# Patient Record
Sex: FEMALE | URBAN - METROPOLITAN AREA
[De-identification: names, ages, dates, MRNs, and addresses within clinical notes are randomized per-mention and may not be internally consistent; named-entity substitution may affect disease eponyms.]

---

## 2022-04-20 ENCOUNTER — APPOINTMENT (RX ONLY)
Dept: URBAN - METROPOLITAN AREA CLINIC 40 | Facility: CLINIC | Age: 22
Setting detail: DERMATOLOGY
End: 2022-04-20

## 2022-04-20 DIAGNOSIS — Z41.9 ENCOUNTER FOR PROCEDURE FOR PURPOSES OTHER THAN REMEDYING HEALTH STATE, UNSPECIFIED: ICD-10-CM

## 2022-04-20 PROCEDURE — ? CHEMICAL PEEL

## 2022-04-20 ASSESSMENT — LOCATION DETAILED DESCRIPTION DERM
LOCATION DETAILED: NASAL DORSUM
LOCATION DETAILED: LEFT CHIN
LOCATION DETAILED: RIGHT INFERIOR CENTRAL MALAR CHEEK
LOCATION DETAILED: INFERIOR MID FOREHEAD
LOCATION DETAILED: LEFT INFERIOR CENTRAL MALAR CHEEK

## 2022-04-20 ASSESSMENT — LOCATION SIMPLE DESCRIPTION DERM
LOCATION SIMPLE: NOSE
LOCATION SIMPLE: RIGHT CHEEK
LOCATION SIMPLE: LEFT CHEEK
LOCATION SIMPLE: INFERIOR FOREHEAD
LOCATION SIMPLE: CHIN

## 2022-04-20 ASSESSMENT — LOCATION ZONE DERM
LOCATION ZONE: FACE
LOCATION ZONE: NOSE

## 2022-04-20 NOTE — HPI: COSMETIC (FACIAL)
Additional History: .\\n\\n\\nPrimary concern is acne scarring on the face. Pt was treated for acne by another dermatologist previously. She reports that the acne was severe for about 3 years and has been much better for the last one year. She has not had any professional treatments, acne was treated with RX tretinoin. She reports significant peeling/flaking/irritation from the tretinoin. \\n\\nAllergies - none\\n\\nCurrent home care-\\nCerave foaming cleanser\\nCerave gentle moisturizer\\nNo spf currently

## 2022-04-20 NOTE — PROCEDURE: CHEMICAL PEEL
Post-Care Instructions: I reviewed with the patient in detail post-care instructions. Patient should avoid sun exposure and wear sun protection.
Prep: The treated area was degreased with pre-peel cleanser, and vaseline was applied for protection of mucous membranes.
Treatment Number: 0
Price (Use Numbers Only, No Special Characters Or $): 195
Detail Level: Zone
Post Peel Care: After the procedure, post-care instructions were reviewed with the patient.
Consent: Prior to the procedure, written consent was obtained and risks were reviewed, including but not limited to: redness, peeling, blistering, pigmentary change, scarring, infection, and pain.
Chemical Peel: Gel Peel GL
Comments: .\\n\\n\\nNew home care routine - \\n\\nAM \\nCerave Cleanser\\nRevision Intellishade Clear\\n\\nPM\\nCerave Cleanser\\nR Essentials Brightening Lotion (skip for one week post peel)\\nR Essentials Barrier Intensive Cream \\n\\nTreatment plan is starting with a light chemical peel today then moving into a series of 4 to 6 VI Peels. \\n\\nLight chemical peel visit. Double cleansed with LHA, brightening pads HQ free, a layer of gel peel GL, let it work for 5 min, removal and water neutralized, soothing facial rinse, phyto corrective gel, revision clear spf. \\n\\nPt reported some tingle from the peel, it was tolerable. No flushing or frosting. I provided a sample of Barrier Intensive Cream for her to use over the next few days. It will likely be a good option for her to use between peels. She does run somewhat sensitive, assess how sensitive at her next appointment. I also recommended that she add RE Brightening Lotion w 2%HQ and Revision Intellishade Clear SPF. She purchased both today. \\n

## 2022-06-15 ENCOUNTER — APPOINTMENT (RX ONLY)
Dept: URBAN - METROPOLITAN AREA CLINIC 40 | Facility: CLINIC | Age: 22
Setting detail: DERMATOLOGY
End: 2022-06-15

## 2022-06-15 DIAGNOSIS — Z41.9 ENCOUNTER FOR PROCEDURE FOR PURPOSES OTHER THAN REMEDYING HEALTH STATE, UNSPECIFIED: ICD-10-CM

## 2022-06-15 PROCEDURE — ? VI PEEL

## 2022-06-15 ASSESSMENT — LOCATION SIMPLE DESCRIPTION DERM
LOCATION SIMPLE: NOSE
LOCATION SIMPLE: LEFT CHEEK
LOCATION SIMPLE: RIGHT CHEEK
LOCATION SIMPLE: CHIN
LOCATION SIMPLE: RIGHT FOREHEAD

## 2022-06-15 ASSESSMENT — LOCATION DETAILED DESCRIPTION DERM
LOCATION DETAILED: LEFT INFERIOR CENTRAL MALAR CHEEK
LOCATION DETAILED: RIGHT MEDIAL FOREHEAD
LOCATION DETAILED: RIGHT INFERIOR CENTRAL MALAR CHEEK
LOCATION DETAILED: NASAL DORSUM
LOCATION DETAILED: LEFT CHIN

## 2022-06-15 ASSESSMENT — LOCATION ZONE DERM
LOCATION ZONE: FACE
LOCATION ZONE: NOSE

## 2022-06-15 NOTE — PROCEDURE: VI PEEL
Detail Level: Zone
Comments: .\\n\\n\\nPrimary concern is acne scarring on the face. Pt was treated for acne by another dermatologist previously.\\nShe reports that the acne was severe for about 3 years and has been much better for the last one year.\\nShe has not had any professional treatments, acne was treated with RX tretinoin. She reports significant peeling/flaking/irritation from the tretinoin.\\n\\nAllergies - none\\n\\nNew home care routine -\\nAM\\nCerave Cleanser\\nRevision Intellishade Clear\\nPM\\nCerave Cleanser\\nR Essentials Brightening Lotion 2%HQ (skip for one week post peel)\\nR Essentials Barrier Intensive Cream (as needed, trial size)\\n\\nTreatment plan is starting with a light chemical peel then moving into a series of 4 to 6 VI\\nPeels. Original first then precision plus depending on her response. \\n\\nVI Peel Original. Double cleansed with LHA, alcohol then acetone degrease, vaseline, applied all the peel solution to her face. \\nPt reported some tingle from the peel, it was tolerable. No flushing or frosting. \\n\\nNext VI Peel in 4 to 6 weeks.\\n
Consent: Prior to the procedure, written consent was obtained and risks were reviewed, including but not limited to: redness, peeling, blistering, pigmentary change, scarring, infection, and pain. Patient is aware multiple treatments may be necessary to achieve the desired outcome.
Prep: The treated area was degreased with pre-peel cleanser, and vaseline was applied for protection of mucous membranes.
Treatment Number: 0
Price (Use Numbers Only, No Special Characters Or $): 400
Chemical Peel: VI Peel
Post-Care Instructions: I reviewed with the patient in detail post-care instructions. Patient should avoid sun exposure and wear sun protection.
Post Peel Care: After the procedure, the patient was instructed not to wash the treated area for 4 hours or manually remove dead skin when the peeling process starts. Patient may use OTC hydrocortisone cream for itching.  Patient instructed to use the provided Retin-A wipes on the treated area on the 1st and 2nd nights.